# Patient Record
Sex: MALE | Race: BLACK OR AFRICAN AMERICAN
[De-identification: names, ages, dates, MRNs, and addresses within clinical notes are randomized per-mention and may not be internally consistent; named-entity substitution may affect disease eponyms.]

---

## 2020-03-12 ENCOUNTER — HOSPITAL ENCOUNTER (INPATIENT)
Dept: HOSPITAL 4 - SED | Age: 35
LOS: 3 days | Discharge: HOME | DRG: 872 | End: 2020-03-15
Payer: COMMERCIAL

## 2020-03-12 VITALS — BODY MASS INDEX: 41.75 KG/M2 | WEIGHT: 315 LBS | HEIGHT: 73 IN

## 2020-03-12 VITALS — SYSTOLIC BLOOD PRESSURE: 126 MMHG

## 2020-03-12 VITALS — SYSTOLIC BLOOD PRESSURE: 161 MMHG

## 2020-03-12 DIAGNOSIS — E66.01: ICD-10-CM

## 2020-03-12 DIAGNOSIS — Z90.49: ICD-10-CM

## 2020-03-12 DIAGNOSIS — Z79.899: ICD-10-CM

## 2020-03-12 DIAGNOSIS — Z79.2: ICD-10-CM

## 2020-03-12 DIAGNOSIS — I87.2: ICD-10-CM

## 2020-03-12 DIAGNOSIS — B00.2: ICD-10-CM

## 2020-03-12 DIAGNOSIS — A41.9: Primary | ICD-10-CM

## 2020-03-12 DIAGNOSIS — I87.8: ICD-10-CM

## 2020-03-12 DIAGNOSIS — L03.116: ICD-10-CM

## 2020-03-12 DIAGNOSIS — I88.9: ICD-10-CM

## 2020-03-12 DIAGNOSIS — I89.0: ICD-10-CM

## 2020-03-12 DIAGNOSIS — M10.9: ICD-10-CM

## 2020-03-12 LAB
ALBUMIN SERPL BCP-MCNC: 3 G/DL (ref 3.4–4.8)
ALT SERPL W P-5'-P-CCNC: 59 U/L (ref 12–78)
ANION GAP SERPL CALCULATED.3IONS-SCNC: 8 MMOL/L (ref 5–15)
APPEARANCE UR: CLEAR
AST SERPL W P-5'-P-CCNC: 41 U/L (ref 10–37)
BASOPHILS # BLD AUTO: 0 K/UL (ref 0–0.2)
BASOPHILS NFR BLD AUTO: 0.1 % (ref 0–2)
BILIRUB SERPL-MCNC: 0.4 MG/DL (ref 0–1)
BILIRUB UR QL STRIP: NEGATIVE
BUN SERPL-MCNC: 13 MG/DL (ref 8–21)
CALCIUM SERPL-MCNC: 9 MG/DL (ref 8.4–11)
CHLORIDE SERPL-SCNC: 101 MMOL/L (ref 98–107)
COLOR UR: YELLOW
CREAT SERPL-MCNC: 0.86 MG/DL (ref 0.55–1.3)
EOSINOPHIL # BLD AUTO: 0.1 K/UL (ref 0–0.4)
EOSINOPHIL NFR BLD AUTO: 0.9 % (ref 0–4)
ERYTHROCYTE [DISTWIDTH] IN BLOOD BY AUTOMATED COUNT: 14.9 % (ref 9–15)
GFR SERPL CREATININE-BSD FRML MDRD: 133 ML/MIN (ref 90–?)
GLUCOSE SERPL-MCNC: 118 MG/DL (ref 70–99)
GLUCOSE UR STRIP-MCNC: NEGATIVE MG/DL
HCT VFR BLD AUTO: 37.2 % (ref 36–54)
HGB BLD-MCNC: 12.1 G/DL (ref 14–18)
HGB UR QL STRIP: NEGATIVE
KETONES UR STRIP-MCNC: NEGATIVE MG/DL
LEUKOCYTE ESTERASE UR QL STRIP: NEGATIVE
LYMPHOCYTES # BLD AUTO: 0.7 K/UL (ref 1–5.5)
LYMPHOCYTES NFR BLD AUTO: 4.6 % (ref 20.5–51.5)
MCH RBC QN AUTO: 29 PG (ref 27–31)
MCHC RBC AUTO-ENTMCNC: 32 % (ref 32–36)
MCV RBC AUTO: 89 FL (ref 79–98)
MONOCYTES # BLD MANUAL: 0.5 K/UL (ref 0–1)
MONOCYTES # BLD MANUAL: 3.1 % (ref 1.7–9.3)
NEUTROPHILS # BLD AUTO: 14 K/UL (ref 1.8–7.7)
NEUTROPHILS NFR BLD AUTO: 91.3 % (ref 40–70)
NITRITE UR QL STRIP: NEGATIVE
PH UR STRIP: 6 [PH] (ref 5–8)
PLATELET # BLD AUTO: 343 K/UL (ref 130–430)
POTASSIUM SERPL-SCNC: 3.9 MMOL/L (ref 3.5–5.1)
PROT UR QL STRIP: NEGATIVE
RBC # BLD AUTO: 4.2 MIL/UL (ref 4.2–6.2)
SODIUM SERPLBLD-SCNC: 136 MMOL/L (ref 136–145)
SP GR UR STRIP: 1.01 (ref 1–1.03)
UROBILINOGEN UR STRIP-MCNC: 0.2 MG/DL (ref 0.2–1)
WBC # BLD AUTO: 15.3 K/UL (ref 4.8–10.8)

## 2020-03-12 RX ADMIN — DEXTROSE MONOHYDRATE SCH MLS/HR: 50 INJECTION, SOLUTION INTRAVENOUS at 21:25

## 2020-03-13 VITALS — SYSTOLIC BLOOD PRESSURE: 122 MMHG

## 2020-03-13 VITALS — SYSTOLIC BLOOD PRESSURE: 121 MMHG

## 2020-03-13 VITALS — SYSTOLIC BLOOD PRESSURE: 106 MMHG

## 2020-03-13 VITALS — SYSTOLIC BLOOD PRESSURE: 148 MMHG

## 2020-03-13 RX ADMIN — DEXTROSE MONOHYDRATE SCH MLS/HR: 50 INJECTION, SOLUTION INTRAVENOUS at 21:30

## 2020-03-13 RX ADMIN — HEPARIN SODIUM SCH UNITS: 5000 INJECTION, SOLUTION INTRAVENOUS; SUBCUTANEOUS at 21:33

## 2020-03-13 RX ADMIN — HEPARIN SODIUM SCH UNITS: 5000 INJECTION, SOLUTION INTRAVENOUS; SUBCUTANEOUS at 09:21

## 2020-03-13 RX ADMIN — SODIUM CHLORIDE SCH MLS/HR: 9 INJECTION, SOLUTION INTRAVENOUS at 16:02

## 2020-03-13 RX ADMIN — DEXTROSE MONOHYDRATE SCH MLS/HR: 50 INJECTION, SOLUTION INTRAVENOUS at 12:27

## 2020-03-13 RX ADMIN — CLINDAMYCIN PHOSPHATE SCH MLS/HR: 150 INJECTION, SOLUTION INTRAMUSCULAR; INTRAVENOUS at 23:55

## 2020-03-13 RX ADMIN — SODIUM CHLORIDE SCH MLS/HR: 9 INJECTION, SOLUTION INTRAVENOUS at 23:55

## 2020-03-13 RX ADMIN — DEXTROSE MONOHYDRATE SCH MLS/HR: 50 INJECTION, SOLUTION INTRAVENOUS at 05:16

## 2020-03-13 RX ADMIN — CLINDAMYCIN PHOSPHATE SCH MLS/HR: 150 INJECTION, SOLUTION INTRAMUSCULAR; INTRAVENOUS at 17:51

## 2020-03-13 RX ADMIN — SODIUM CHLORIDE SCH MLS/HR: 9 INJECTION, SOLUTION INTRAVENOUS at 09:20

## 2020-03-14 VITALS — SYSTOLIC BLOOD PRESSURE: 135 MMHG

## 2020-03-14 VITALS — SYSTOLIC BLOOD PRESSURE: 118 MMHG

## 2020-03-14 VITALS — SYSTOLIC BLOOD PRESSURE: 120 MMHG

## 2020-03-14 VITALS — SYSTOLIC BLOOD PRESSURE: 121 MMHG

## 2020-03-14 VITALS — SYSTOLIC BLOOD PRESSURE: 128 MMHG

## 2020-03-14 VITALS — SYSTOLIC BLOOD PRESSURE: 126 MMHG

## 2020-03-14 LAB
ANION GAP SERPL CALCULATED.3IONS-SCNC: 6 MMOL/L (ref 5–15)
BASOPHILS # BLD AUTO: 0 K/UL (ref 0–0.2)
BASOPHILS NFR BLD AUTO: 0.4 % (ref 0–2)
BUN SERPL-MCNC: 9 MG/DL (ref 8–21)
CALCIUM SERPL-MCNC: 8.4 MG/DL (ref 8.4–11)
CHLORIDE SERPL-SCNC: 103 MMOL/L (ref 98–107)
CREAT SERPL-MCNC: 0.82 MG/DL (ref 0.55–1.3)
EOSINOPHIL # BLD AUTO: 0.1 K/UL (ref 0–0.4)
EOSINOPHIL NFR BLD AUTO: 1.4 % (ref 0–4)
ERYTHROCYTE [DISTWIDTH] IN BLOOD BY AUTOMATED COUNT: 15.1 % (ref 9–15)
GFR SERPL CREATININE-BSD FRML MDRD: 140 ML/MIN (ref 90–?)
GLUCOSE SERPL-MCNC: 92 MG/DL (ref 70–99)
HCT VFR BLD AUTO: 31.6 % (ref 36–54)
HGB BLD-MCNC: 10.3 G/DL (ref 14–18)
LYMPHOCYTES # BLD AUTO: 1 K/UL (ref 1–5.5)
LYMPHOCYTES NFR BLD AUTO: 9 % (ref 20.5–51.5)
MCH RBC QN AUTO: 29 PG (ref 27–31)
MCHC RBC AUTO-ENTMCNC: 33 % (ref 32–36)
MCV RBC AUTO: 90 FL (ref 79–98)
MONOCYTES # BLD MANUAL: 0.9 K/UL (ref 0–1)
MONOCYTES # BLD MANUAL: 8 % (ref 1.7–9.3)
NEUTROPHILS # BLD AUTO: 8.7 K/UL (ref 1.8–7.7)
NEUTROPHILS NFR BLD AUTO: 81.2 % (ref 40–70)
PLATELET # BLD AUTO: 290 K/UL (ref 130–430)
POTASSIUM SERPL-SCNC: 3.8 MMOL/L (ref 3.5–5.1)
RBC # BLD AUTO: 3.52 MIL/UL (ref 4.2–6.2)
SODIUM SERPLBLD-SCNC: 136 MMOL/L (ref 136–145)
WBC # BLD AUTO: 10.7 K/UL (ref 4.8–10.8)

## 2020-03-14 RX ADMIN — CLINDAMYCIN PHOSPHATE SCH MLS/HR: 150 INJECTION, SOLUTION INTRAMUSCULAR; INTRAVENOUS at 17:48

## 2020-03-14 RX ADMIN — HEPARIN SODIUM SCH UNITS: 5000 INJECTION, SOLUTION INTRAVENOUS; SUBCUTANEOUS at 09:17

## 2020-03-14 RX ADMIN — CLINDAMYCIN PHOSPHATE SCH MLS/HR: 150 INJECTION, SOLUTION INTRAMUSCULAR; INTRAVENOUS at 23:58

## 2020-03-14 RX ADMIN — CLINDAMYCIN PHOSPHATE SCH MLS/HR: 150 INJECTION, SOLUTION INTRAMUSCULAR; INTRAVENOUS at 12:19

## 2020-03-14 RX ADMIN — HEPARIN SODIUM SCH UNITS: 5000 INJECTION, SOLUTION INTRAVENOUS; SUBCUTANEOUS at 21:09

## 2020-03-14 RX ADMIN — SODIUM CHLORIDE SCH MLS/HR: 9 INJECTION, SOLUTION INTRAVENOUS at 12:20

## 2020-03-14 RX ADMIN — DEXTROSE MONOHYDRATE SCH MLS/HR: 50 INJECTION, SOLUTION INTRAVENOUS at 15:25

## 2020-03-14 RX ADMIN — DEXTROSE MONOHYDRATE SCH MLS/HR: 50 INJECTION, SOLUTION INTRAVENOUS at 21:00

## 2020-03-14 RX ADMIN — SODIUM CHLORIDE SCH MLS/HR: 9 INJECTION, SOLUTION INTRAVENOUS at 05:02

## 2020-03-14 RX ADMIN — DEXTROSE MONOHYDRATE SCH MLS/HR: 50 INJECTION, SOLUTION INTRAVENOUS at 05:01

## 2020-03-14 RX ADMIN — SODIUM CHLORIDE SCH MLS/HR: 9 INJECTION, SOLUTION INTRAVENOUS at 21:00

## 2020-03-14 RX ADMIN — CLINDAMYCIN PHOSPHATE SCH MLS/HR: 150 INJECTION, SOLUTION INTRAMUSCULAR; INTRAVENOUS at 05:02

## 2020-03-15 VITALS — SYSTOLIC BLOOD PRESSURE: 127 MMHG

## 2020-03-15 VITALS — SYSTOLIC BLOOD PRESSURE: 139 MMHG

## 2020-03-15 VITALS — SYSTOLIC BLOOD PRESSURE: 136 MMHG

## 2020-03-15 LAB
ANION GAP SERPL CALCULATED.3IONS-SCNC: 6 MMOL/L (ref 5–15)
BASOPHILS # BLD AUTO: 0 K/UL (ref 0–0.2)
BASOPHILS NFR BLD AUTO: 0.8 % (ref 0–2)
BUN SERPL-MCNC: 9 MG/DL (ref 8–21)
CALCIUM SERPL-MCNC: 8.8 MG/DL (ref 8.4–11)
CHLORIDE SERPL-SCNC: 103 MMOL/L (ref 98–107)
CREAT SERPL-MCNC: 0.85 MG/DL (ref 0.55–1.3)
EOSINOPHIL # BLD AUTO: 0.2 K/UL (ref 0–0.4)
EOSINOPHIL NFR BLD AUTO: 3.9 % (ref 0–4)
ERYTHROCYTE [DISTWIDTH] IN BLOOD BY AUTOMATED COUNT: 15 % (ref 9–15)
GFR SERPL CREATININE-BSD FRML MDRD: 134 ML/MIN (ref 90–?)
GLUCOSE SERPL-MCNC: 107 MG/DL (ref 70–99)
HCT VFR BLD AUTO: 33.3 % (ref 36–54)
HGB BLD-MCNC: 10.8 G/DL (ref 14–18)
LYMPHOCYTES # BLD AUTO: 0.6 K/UL (ref 1–5.5)
LYMPHOCYTES NFR BLD AUTO: 11.2 % (ref 20.5–51.5)
MCH RBC QN AUTO: 29 PG (ref 27–31)
MCHC RBC AUTO-ENTMCNC: 32 % (ref 32–36)
MCV RBC AUTO: 90 FL (ref 79–98)
MONOCYTES # BLD MANUAL: 0.8 K/UL (ref 0–1)
MONOCYTES # BLD MANUAL: 14.6 % (ref 1.7–9.3)
NEUTROPHILS # BLD AUTO: 3.9 K/UL (ref 1.8–7.7)
NEUTROPHILS NFR BLD AUTO: 69.5 % (ref 40–70)
PLATELET # BLD AUTO: 295 K/UL (ref 130–430)
POTASSIUM SERPL-SCNC: 4.4 MMOL/L (ref 3.5–5.1)
RBC # BLD AUTO: 3.71 MIL/UL (ref 4.2–6.2)
SODIUM SERPLBLD-SCNC: 138 MMOL/L (ref 136–145)
WBC # BLD AUTO: 5.6 K/UL (ref 4.8–10.8)

## 2020-03-15 RX ADMIN — SODIUM CHLORIDE SCH MLS/HR: 9 INJECTION, SOLUTION INTRAVENOUS at 02:50

## 2020-03-15 RX ADMIN — HEPARIN SODIUM SCH UNITS: 5000 INJECTION, SOLUTION INTRAVENOUS; SUBCUTANEOUS at 09:03

## 2020-03-15 RX ADMIN — DEXTROSE MONOHYDRATE SCH MLS/HR: 50 INJECTION, SOLUTION INTRAVENOUS at 06:18

## 2020-03-15 RX ADMIN — CLINDAMYCIN PHOSPHATE SCH MLS/HR: 150 INJECTION, SOLUTION INTRAMUSCULAR; INTRAVENOUS at 05:13

## 2020-06-19 ENCOUNTER — HOSPITAL ENCOUNTER (EMERGENCY)
Dept: HOSPITAL 4 - SED | Age: 35
Discharge: HOME | End: 2020-06-19
Payer: COMMERCIAL

## 2020-06-19 VITALS — HEIGHT: 73 IN | WEIGHT: 315 LBS | BODY MASS INDEX: 41.75 KG/M2 | SYSTOLIC BLOOD PRESSURE: 155 MMHG

## 2020-06-19 VITALS — SYSTOLIC BLOOD PRESSURE: 145 MMHG

## 2020-06-19 DIAGNOSIS — M25.522: Primary | ICD-10-CM

## 2020-06-19 DIAGNOSIS — Z79.899: ICD-10-CM

## 2020-06-19 PROCEDURE — 73080 X-RAY EXAM OF ELBOW: CPT

## 2020-06-19 PROCEDURE — 93005 ELECTROCARDIOGRAM TRACING: CPT

## 2020-06-19 PROCEDURE — 99283 EMERGENCY DEPT VISIT LOW MDM: CPT

## 2020-06-19 PROCEDURE — 96372 THER/PROPH/DIAG INJ SC/IM: CPT

## 2020-06-23 ENCOUNTER — HOSPITAL ENCOUNTER (INPATIENT)
Dept: HOSPITAL 4 - SED | Age: 35
LOS: 7 days | Discharge: HOME HEALTH | DRG: 549 | End: 2020-06-30
Attending: INTERNAL MEDICINE | Admitting: INTERNAL MEDICINE
Payer: COMMERCIAL

## 2020-06-23 VITALS — BODY MASS INDEX: 41.75 KG/M2 | WEIGHT: 315 LBS | HEIGHT: 73 IN

## 2020-06-23 VITALS — SYSTOLIC BLOOD PRESSURE: 165 MMHG

## 2020-06-23 DIAGNOSIS — Z90.49: ICD-10-CM

## 2020-06-23 DIAGNOSIS — I10: ICD-10-CM

## 2020-06-23 DIAGNOSIS — M00.9: ICD-10-CM

## 2020-06-23 DIAGNOSIS — M00.222: Primary | ICD-10-CM

## 2020-06-23 DIAGNOSIS — B95.1: ICD-10-CM

## 2020-06-23 DIAGNOSIS — N39.0: ICD-10-CM

## 2020-06-23 DIAGNOSIS — L03.115: ICD-10-CM

## 2020-06-23 DIAGNOSIS — E66.01: ICD-10-CM

## 2020-06-23 DIAGNOSIS — M70.22: ICD-10-CM

## 2020-06-23 DIAGNOSIS — E44.0: ICD-10-CM

## 2020-06-23 DIAGNOSIS — L03.116: ICD-10-CM

## 2020-06-23 DIAGNOSIS — Z79.899: ICD-10-CM

## 2020-06-23 DIAGNOSIS — M10.9: ICD-10-CM

## 2020-06-23 DIAGNOSIS — B96.20: ICD-10-CM

## 2020-06-23 LAB
ALBUMIN SERPL BCP-MCNC: 2.8 G/DL (ref 3.4–4.8)
ALT SERPL W P-5'-P-CCNC: 66 U/L (ref 12–78)
ANION GAP SERPL CALCULATED.3IONS-SCNC: 7 MMOL/L (ref 5–15)
APPEARANCE FLD: (no result)
APPEARANCE SPUN FLD: (no result)
AST SERPL W P-5'-P-CCNC: 39 U/L (ref 10–37)
BASOPHILS # BLD AUTO: 0.1 K/UL (ref 0–0.2)
BASOPHILS NFR BLD AUTO: 0.9 % (ref 0–2)
BILIRUB SERPL-MCNC: 0.4 MG/DL (ref 0–1)
BODY FLD TYPE: (no result)
BODY FLD TYPE: (no result)
BUN SERPL-MCNC: 10 MG/DL (ref 8–21)
CALCIUM SERPL-MCNC: 8.8 MG/DL (ref 8.4–11)
CHLORIDE SERPL-SCNC: 100 MMOL/L (ref 98–107)
COLOR FLD: (no result)
CREAT SERPL-MCNC: 0.76 MG/DL (ref 0.55–1.3)
CRP SERPL-MCNC: 15.2 MG/DL (ref 0–0.5)
EOSINOPHIL # BLD AUTO: 0.4 K/UL (ref 0–0.4)
EOSINOPHIL FLD QL MICRO: 0 %
EOSINOPHIL NFR BLD AUTO: 5 % (ref 0–4)
ERYTHROCYTE [DISTWIDTH] IN BLOOD BY AUTOMATED COUNT: 16.3 % (ref 9–15)
GFR SERPL CREATININE-BSD FRML MDRD: 153 ML/MIN (ref 90–?)
GLUCOSE SERPL-MCNC: 92 MG/DL (ref 70–99)
HCT VFR BLD AUTO: 35.8 % (ref 36–54)
HGB BLD-MCNC: 11.4 G/DL (ref 14–18)
LYMPHOCYTES # BLD AUTO: 0.9 K/UL (ref 1–5.5)
LYMPHOCYTES NFR BLD AUTO: 10.1 % (ref 20.5–51.5)
LYMPHOCYTES NFR FLD MANUAL: 6 %
MCH RBC QN AUTO: 28 PG (ref 27–31)
MCHC RBC AUTO-ENTMCNC: 32 % (ref 32–36)
MCV RBC AUTO: 88 FL (ref 79–98)
MONOCYTES # BLD MANUAL: 0.6 K/UL (ref 0–1)
MONOCYTES # BLD MANUAL: 6.9 % (ref 1.7–9.3)
MONOCYTES NFR FLD MANUAL: 33 %
NEUTROPHILS # BLD AUTO: 6.7 K/UL (ref 1.8–7.7)
NEUTROPHILS NFR BLD AUTO: 77.1 % (ref 40–70)
NEUTROPHILS NFR FLD MANUAL: 61 %
PLATELET # BLD AUTO: 425 K/UL (ref 130–430)
POTASSIUM SERPL-SCNC: 4.2 MMOL/L (ref 3.5–5.1)
RBC # BLD AUTO: 4.08 MIL/UL (ref 4.2–6.2)
RBC # FLD MANUAL: (no result) /UL
SODIUM SERPLBLD-SCNC: 138 MMOL/L (ref 136–145)
SPECIMEN VOL FLD: 10 ML
URATE UR-MCNC: 5.5 MG/DL (ref 2.4–7)
WBC # BLD AUTO: 8.7 K/UL (ref 4.8–10.8)
WBC # FLD MANUAL: (no result) /UL

## 2020-06-23 PROCEDURE — C1751 CATH, INF, PER/CENT/MIDLINE: HCPCS

## 2020-06-23 NOTE — NUR
Patient arrived in the ED c/o severe left elbow pain that started a week ago.  
Denied any chest pain or shortness of breath.  Denied any fevers, chills, 
nausea or vomiting.  Patient is alert and oriented x4, respirations even and 
unlabored, speaking in full sentences, and ambulating with a steady gait.  VSS, 
pain level 8/10.  Informed of the approximate wait time.  Instructed to notify 
ED staff for any changes in condition or worsening of symptoms while waiting to 
be seen by an ED provider.  Patient verbalized understanding.

## 2020-06-23 NOTE — NUR
Pt resting in ED bed comfortably. Given additional pillow and additional 
blankets for comfort. Pt states pain is well-controlled. Tolerating IV 
antibiotics well.

## 2020-06-23 NOTE — NUR
bedside performing Sterile aspiration of synovial joint fluid for 
evaluation. aspirated with 18 guage sterile needle. 10cc of purulent/bloody 
drainage aspirated and sent to lab. Pt tolerated procedure well. Pain 
medications administered pre procedure and lidocaine used locally for comfort.

## 2020-06-23 NOTE — NUR
# 18 gauge angiocath placed to RAC.  Use of asceptic technique.  Opsite placed 
over site.  Blood return noted.  Blood for lab drawn from site.  Flushed with 
10 cc of normal saline.  No evidence of infiltration noted.  Patient tolerated 
well.

## 2020-06-24 VITALS — SYSTOLIC BLOOD PRESSURE: 166 MMHG

## 2020-06-24 VITALS — SYSTOLIC BLOOD PRESSURE: 142 MMHG

## 2020-06-24 VITALS — SYSTOLIC BLOOD PRESSURE: 148 MMHG

## 2020-06-24 VITALS — SYSTOLIC BLOOD PRESSURE: 139 MMHG

## 2020-06-24 VITALS — SYSTOLIC BLOOD PRESSURE: 117 MMHG

## 2020-06-24 LAB
APPEARANCE UR: CLEAR
BILIRUB UR QL STRIP: NEGATIVE
COLOR UR: YELLOW
GLUCOSE FLD-MCNC: 2 MG/DL
GLUCOSE UR STRIP-MCNC: NEGATIVE MG/DL
HGB UR QL STRIP: NEGATIVE
KETONES UR STRIP-MCNC: NEGATIVE MG/DL
LEUKOCYTE ESTERASE UR QL STRIP: NEGATIVE
NITRITE UR QL STRIP: NEGATIVE
PH UR STRIP: 6.5 [PH] (ref 5–8)
PROT FLD-MCNC: 6.5 G/DL
PROT UR QL STRIP: NEGATIVE
SP GR UR STRIP: 1.01 (ref 1–1.03)
UROBILINOGEN UR STRIP-MCNC: 0.2 MG/DL (ref 0.2–1)

## 2020-06-24 RX ADMIN — DEXTROSE AND SODIUM CHLORIDE SCH MLS/HR: 5; 450 INJECTION, SOLUTION INTRAVENOUS at 14:22

## 2020-06-24 RX ADMIN — DEXTROSE MONOHYDRATE SCH MLS/HR: 50 INJECTION, SOLUTION INTRAVENOUS at 14:22

## 2020-06-24 RX ADMIN — HYDROMORPHONE HYDROCHLORIDE PRN MG: 2 INJECTION INTRAMUSCULAR; INTRAVENOUS; SUBCUTANEOUS at 15:47

## 2020-06-24 RX ADMIN — DEXTROSE AND SODIUM CHLORIDE SCH MLS/HR: 5; 450 INJECTION, SOLUTION INTRAVENOUS at 05:48

## 2020-06-24 RX ADMIN — DEXTROSE AND SODIUM CHLORIDE SCH MLS/HR: 5; 450 INJECTION, SOLUTION INTRAVENOUS at 21:40

## 2020-06-24 RX ADMIN — FAMOTIDINE SCH MG: 20 TABLET, FILM COATED ORAL at 10:25

## 2020-06-24 RX ADMIN — COLCHICINE SCH MG: 0.6 TABLET, FILM COATED ORAL at 20:10

## 2020-06-24 RX ADMIN — HYDROMORPHONE HYDROCHLORIDE PRN MG: 2 INJECTION INTRAMUSCULAR; INTRAVENOUS; SUBCUTANEOUS at 05:51

## 2020-06-24 RX ADMIN — HYDROMORPHONE HYDROCHLORIDE PRN MG: 2 INJECTION INTRAMUSCULAR; INTRAVENOUS; SUBCUTANEOUS at 10:33

## 2020-06-24 RX ADMIN — HYDROMORPHONE HYDROCHLORIDE PRN MG: 2 INJECTION INTRAMUSCULAR; INTRAVENOUS; SUBCUTANEOUS at 20:10

## 2020-06-24 RX ADMIN — DEXTROSE MONOHYDRATE SCH MLS/HR: 50 INJECTION, SOLUTION INTRAVENOUS at 23:11

## 2020-06-24 NOTE — NUR
RN Rounds

patient sitting up in bed, respirations even and unlabored on room air, no acute distress 
noted, patient reports pain is controlled at this time.

## 2020-06-24 NOTE — NUR
DR NICOLE



RECEIVED PHONE CALL FROM DR NICOLE WHO STATED THAT THEIR GROUP IS NOT A PART OF PATIENT'S 
INSURANCE GROUP AND WILL NOT BE ABLE TO SEE PATIENT.  DR HAWTHORNE PAGED TO NOTIFY

## 2020-06-24 NOTE — NUR
CONSULTATION PAGED



REASON FOR CONSULTATION:SEPTIC ARTHRITIS

WAS CONSULT CALLED?y

PERSON WHO WAS NOTIFIED:

CONSULTING PHYSICIAN:ROBERTO ALLISON

CONSULTANT SPECIALTY:INFECTIOUS DISEASE

CONSULTANT PHONE NUMBER:[phone]

ORDERING PHYSICIAN:MARTINE LAZO

## 2020-06-24 NOTE — NUR
Late entry: received call from dr. Orona re: getting BENOIT or auth for dr. Garcia/ septic 
elbow joint consultation. There is no insurance contact number avaialble in Bar [employer]. I  
requested Celeste /admitting dept to provide the insurance contact number asap.

## 2020-06-24 NOTE — NUR
Pt resting in ED bed at this time. Assisted in re-adjusting. Tolerating IV 
Antibiotics well. No s/s of adverse reactions.

## 2020-06-24 NOTE — NUR
Ortho consult called:

for Dr. Garcia, regarding septic elbow joint, ordered by Dr. Orona, spoke with Rosmery.

## 2020-06-24 NOTE — NUR
OPENING NOTE

RECEIVED CARE OF PT AND SBAR REPORT. PT IS AAOX4, RESTING IN BED, IN NO ACUTE DISTRESS. 
BREATHING IS EVEN AND UNLABORED TO ROOM AIR. IVF INFUSING AT ORDERED RATE. SAFETY AND FALL 
PRECAUTIONS ARE IN PLACE. CALL LIGHT IS WITH PT. WILL MONITOR.

## 2020-06-24 NOTE — NUR
Opening Note

patient brought to room via gurney, received bedside SBAR report from ER RN, patient 
ambulated to bathroom, voided x1, patient ambulated to bed, provided patient with clean 
gown, cardiac monitor in place, IV fluids infusing well to left AC, provided patient with 
water and hygiene items, educated patient on use of call light and asked to call for 
assistance, patient verbalized understanding, call light in reach, educated patient on use 
of bed alarm for patient safety, patient refusing bed alarm, bed in low and locked position.

## 2020-06-24 NOTE — NUR
Patient will be admitted to care of Dr. Orona.  Admitted to Tele unit.  Will go 
to room 109A.  Belongings list completed.  Complete and up to date summary 
report printed. SBAR report to be given at bedside with opportunity for 
questions. IV site intact and currently infusing.

## 2020-06-24 NOTE — NUR
RN Rounds

patient sitting up in bed eating dinner, tolerating well, patient denies any nausea or 
vomiting, patient reports pain is controlled.

## 2020-06-24 NOTE — NUR
Pt states that pain is returning. requested Hydromorphone from Floor pyxis. 
Assisted in adjusting patient in bed. Pt tolerated movement well. Vancomycin 
completed.

## 2020-06-24 NOTE — NUR
Physician Rounds

Dr. Orona at bedside examining patient, informed Dr. Orona that per Dr. Garcia his group 
does not accept the patients insurance.

## 2020-06-24 NOTE — NUR
Closing Note

bedside SBAR report given to receiving RN, patient resting in bed, respirations even and 
unlabored on room air, no acute distress noted, educated patient on use of call light and 
asked to call for assistance, patient verbalized understanding, call light in reach, 
educated patient on use of bed alarm for patient safety, patient refusing bed alarm, bed in 
low and locked position, care endorsed to night shift RN.

## 2020-06-24 NOTE — NUR
RN Rounds

patient resting in bed, respirations even and unlabored on room air, no acute distress 
noted, patient reports pain is controlled at this time.

## 2020-06-25 VITALS — SYSTOLIC BLOOD PRESSURE: 122 MMHG

## 2020-06-25 VITALS — SYSTOLIC BLOOD PRESSURE: 117 MMHG

## 2020-06-25 VITALS — SYSTOLIC BLOOD PRESSURE: 140 MMHG

## 2020-06-25 VITALS — SYSTOLIC BLOOD PRESSURE: 142 MMHG

## 2020-06-25 LAB
ALBUMIN SERPL BCP-MCNC: 2.5 G/DL (ref 3.4–4.8)
ALT SERPL W P-5'-P-CCNC: 54 U/L (ref 12–78)
ANION GAP SERPL CALCULATED.3IONS-SCNC: < 3 MMOL/L (ref 5–15)
AST SERPL W P-5'-P-CCNC: 34 U/L (ref 10–37)
BASOPHILS # BLD AUTO: 0 K/UL (ref 0–0.2)
BASOPHILS NFR BLD AUTO: 0.5 % (ref 0–2)
BILIRUB SERPL-MCNC: 0.4 MG/DL (ref 0–1)
BUN SERPL-MCNC: 8 MG/DL (ref 8–21)
CALCIUM SERPL-MCNC: 8.4 MG/DL (ref 8.4–11)
CHLORIDE SERPL-SCNC: 97 MMOL/L (ref 98–107)
CREAT SERPL-MCNC: 0.7 MG/DL (ref 0.55–1.3)
EOSINOPHIL # BLD AUTO: 0.3 K/UL (ref 0–0.4)
EOSINOPHIL NFR BLD AUTO: 3.4 % (ref 0–4)
ERYTHROCYTE [DISTWIDTH] IN BLOOD BY AUTOMATED COUNT: 16 % (ref 9–15)
GFR SERPL CREATININE-BSD FRML MDRD: 168 ML/MIN (ref 90–?)
GLUCOSE SERPL-MCNC: 106 MG/DL (ref 70–99)
HCT VFR BLD AUTO: 33.3 % (ref 36–54)
HGB BLD-MCNC: 10.8 G/DL (ref 14–18)
LYMPHOCYTES # BLD AUTO: 0.7 K/UL (ref 1–5.5)
LYMPHOCYTES NFR BLD AUTO: 7.4 % (ref 20.5–51.5)
MCH RBC QN AUTO: 28 PG (ref 27–31)
MCHC RBC AUTO-ENTMCNC: 33 % (ref 32–36)
MCV RBC AUTO: 87 FL (ref 79–98)
MONOCYTES # BLD MANUAL: 0.7 K/UL (ref 0–1)
MONOCYTES # BLD MANUAL: 7.4 % (ref 1.7–9.3)
NEUTROPHILS # BLD AUTO: 7.5 K/UL (ref 1.8–7.7)
NEUTROPHILS NFR BLD AUTO: 81.3 % (ref 40–70)
PLATELET # BLD AUTO: 410 K/UL (ref 130–430)
POTASSIUM SERPL-SCNC: 4.4 MMOL/L (ref 3.5–5.1)
RBC # BLD AUTO: 3.82 MIL/UL (ref 4.2–6.2)
SODIUM SERPLBLD-SCNC: 131 MMOL/L (ref 136–145)
TSH SERPL DL<=0.05 MIU/L-ACNC: 6.35 UIU/ML (ref 0.34–4.82)
WBC # BLD AUTO: 9.2 K/UL (ref 4.8–10.8)

## 2020-06-25 RX ADMIN — HYDROMORPHONE HYDROCHLORIDE PRN MG: 2 INJECTION INTRAMUSCULAR; INTRAVENOUS; SUBCUTANEOUS at 01:40

## 2020-06-25 RX ADMIN — DEXTROSE MONOHYDRATE SCH MLS/HR: 50 INJECTION, SOLUTION INTRAVENOUS at 21:55

## 2020-06-25 RX ADMIN — HYDROMORPHONE HYDROCHLORIDE PRN MG: 2 INJECTION INTRAMUSCULAR; INTRAVENOUS; SUBCUTANEOUS at 09:47

## 2020-06-25 RX ADMIN — COLCHICINE SCH MG: 0.6 TABLET, FILM COATED ORAL at 09:39

## 2020-06-25 RX ADMIN — HYDROMORPHONE HYDROCHLORIDE PRN MG: 2 INJECTION INTRAMUSCULAR; INTRAVENOUS; SUBCUTANEOUS at 21:56

## 2020-06-25 RX ADMIN — HYDROMORPHONE HYDROCHLORIDE PRN MG: 2 INJECTION INTRAMUSCULAR; INTRAVENOUS; SUBCUTANEOUS at 14:33

## 2020-06-25 RX ADMIN — HYDROMORPHONE HYDROCHLORIDE PRN MG: 2 INJECTION INTRAMUSCULAR; INTRAVENOUS; SUBCUTANEOUS at 05:41

## 2020-06-25 RX ADMIN — DEXTROSE MONOHYDRATE SCH MLS/HR: 50 INJECTION, SOLUTION INTRAVENOUS at 05:41

## 2020-06-25 RX ADMIN — DEXTROSE MONOHYDRATE SCH MLS/HR: 50 INJECTION, SOLUTION INTRAVENOUS at 14:25

## 2020-06-25 RX ADMIN — COLCHICINE SCH MG: 0.6 TABLET, FILM COATED ORAL at 21:55

## 2020-06-25 RX ADMIN — COLCHICINE SCH MG: 0.6 TABLET, FILM COATED ORAL at 14:25

## 2020-06-25 RX ADMIN — FAMOTIDINE SCH MG: 20 TABLET, FILM COATED ORAL at 09:39

## 2020-06-25 NOTE — NUR
RN Rounds

patient sitting up in bed eating dinner, patient tolerating regular diet well, patient 
denies any nausea or vomiting.

## 2020-06-25 NOTE — NUR
INITIAL NOTE

RECEIVED PATIENT AWAKE IN BED WATHCING TV. LEFT SLING IN PLACE PILLOW SUPPORT UNDER BUE. BED 
ALARM ON, BED LOCKED AND IN LOWEST POSITION. SAFETY MEASURES IN PLACE. CALL LIGHT WITHIN 
REACH.

## 2020-06-25 NOTE — NUR
PAIN/DILAUDID

PT REPORTING SEVERE PAIN. DILAUDID 1 MG IVP ADMINISTERED AS ORDERED PRN FOR SEVERE PAIN. 
MEDICATION AND POTENTIAL SIDE EFFECTS DISCUSSED, PT VERBALIZED UNDERSTANDING. URINAL EMPTIED 
AT THIS TIME. PT DENIES FURTHER NEEDS. SAFETY AND FALL PRECAUTIONS MAINTAINED. WILL MONITOR.

## 2020-06-25 NOTE — NUR
Physician Rounds

Dr. Guthrie at bedside examining patient, new medication orders received, verified with 
read back.

## 2020-06-25 NOTE — NUR
RN Rounds

patient sitting up in bed, respirations even and unlabored on room air, no acute distress 
noted.

## 2020-06-25 NOTE — NUR
DC Planning: discussed dc plan with dr. Orona: She is reevaluating whether the pt will need 
to transfer out to a contracted insurance network for septic elbow or can f/u care out 
patient. Dr Orona made aware, [facility] is 
out of area here. The pt may need to transfer out for other intervention/surgery. Dr. Orona 
asked me to notified dr. Garcia for his  consultation consideration . I called dr. Garcia 
office asking for his return call. 

-------------------------------------------------------------------------------

Addendum: 06/25/20 at 1506 by Liborio Frederick RN

-------------------------------------------------------------------------------

>> Called back from dr. Garcia: stated he is not contracted with the [facility] and 
declined to see the pt. -- Dr Orona made aware.

## 2020-06-25 NOTE — NUR
SPOKE WITH DR HAWTHORNE

RELAYED MESSAGE FROM DR. FRANCISCO TO DR. HAWTHORNE. DR HAWTHORNE INFORMED NURSE DR NICOLE HAS BEEN 
CONTACTED FOR CONSULT.

## 2020-06-25 NOTE — NUR
Nutrition Update



Chip Scale 18 noted.

Pt admitted for septic, arthritis 

Diet: clear liquid

BMI: 66.0 kg/m2



RD to follow per nutrition care standards.

## 2020-06-25 NOTE — NUR
RN Rounds

patient resting in bed, respirations even and unlabored on room air, provided patient with 
water, no acute distress noted.

## 2020-06-25 NOTE — NUR
Opening Note

received bedside SBAR report from night shift RN, patient resting in bed, respirations even 
and unlabored on room air, no acute distress noted, patient reports pain is controlled at 
this time, educated patient on use of call light and asked to call for assistance, patient 
verbalized understanding, call light in reach, educated patient on use of bed alarm for 
patient safety, patient refusing bed alarm, bed in low and locked position.

## 2020-06-25 NOTE — NUR
Closing Note

bedside SBAR report given to receiving RN, patient ambulated to bathroom, BM x1, assisted 
patient to clean, no acute distress noted, educated patient on use of call light and asked 
to call for assistance, patient verbalized understanding, call light in reach, educated 
patient on use of bed alarm for patient safety, patient refusing bed alarm, bed in low and 
locked position, care endorsed to night shift RN.

## 2020-06-25 NOTE — NUR
MED PASS

PT REPORTING SEVERE PAIN. DILAUDID 1 MG IVP ADMINISTERED AS ORDERED PRN FOR SEVERE PAIN. 
SCHEDULED ANTIBIOTIC GIVEN. MEDICATION AND POTENTIAL SIDE EFFECTS DISCUSSED, PT VERBALIZED 
UNDERSTANDING. URINAL EMPTIED AT THIS TIME. PT DENIES FURTHER NEEDS. SAFETY AND FALL 
PRECAUTIONS MAINTAINED. WILL MONITOR.

## 2020-06-25 NOTE — NUR
PAIN MEDS 

PATIENT COMPLAINING OF PAIN TO LEFT ELBOW, MEDICATED AS ORDERED. PT TOLERATED PAIN MEDS.

## 2020-06-26 VITALS — SYSTOLIC BLOOD PRESSURE: 135 MMHG

## 2020-06-26 VITALS — SYSTOLIC BLOOD PRESSURE: 153 MMHG

## 2020-06-26 VITALS — SYSTOLIC BLOOD PRESSURE: 116 MMHG

## 2020-06-26 VITALS — SYSTOLIC BLOOD PRESSURE: 120 MMHG

## 2020-06-26 RX ADMIN — COLCHICINE SCH MG: 0.6 TABLET, FILM COATED ORAL at 20:38

## 2020-06-26 RX ADMIN — DEXTROSE MONOHYDRATE SCH MLS/HR: 50 INJECTION, SOLUTION INTRAVENOUS at 06:06

## 2020-06-26 RX ADMIN — SODIUM CHLORIDE SCH MLS/HR: 9 INJECTION, SOLUTION INTRAVENOUS at 23:20

## 2020-06-26 RX ADMIN — HYDROMORPHONE HYDROCHLORIDE PRN MG: 2 INJECTION INTRAMUSCULAR; INTRAVENOUS; SUBCUTANEOUS at 23:48

## 2020-06-26 RX ADMIN — HYDROMORPHONE HYDROCHLORIDE PRN MG: 2 INJECTION INTRAMUSCULAR; INTRAVENOUS; SUBCUTANEOUS at 01:16

## 2020-06-26 RX ADMIN — COLCHICINE SCH MG: 0.6 TABLET, FILM COATED ORAL at 15:28

## 2020-06-26 RX ADMIN — HYDROMORPHONE HYDROCHLORIDE PRN MG: 2 INJECTION INTRAMUSCULAR; INTRAVENOUS; SUBCUTANEOUS at 06:05

## 2020-06-26 RX ADMIN — COLCHICINE SCH MG: 0.6 TABLET, FILM COATED ORAL at 09:18

## 2020-06-26 RX ADMIN — SODIUM CHLORIDE SCH MLS/HR: 9 INJECTION, SOLUTION INTRAVENOUS at 18:00

## 2020-06-26 RX ADMIN — FAMOTIDINE SCH MG: 20 TABLET, FILM COATED ORAL at 09:19

## 2020-06-26 NOTE — NUR
IV INSERTED BY ANOTHER RN TRIED X2 UNSUCCESSFUL. TRIED X 3 UNSUCCESSFUL. ANOTHER RN WAS 
CALLED IV INSERTED ON

HIS RFA WITH ANGIO # 2O;COVERED WITH KERLIX PER PT'S REQUEST. CALL LIGHT WITHIN REACH.

## 2020-06-26 NOTE — NUR
Patient is in bed resting. Will endorse IV insertion to night shift nurse. Patient is alert 
and oriented x4. Patient denies pain and shortness of breath. Patient is able to ambulate 
independently. Bed in low position. Call light in reach. Will give report to night nurse.

## 2020-06-26 NOTE — NUR
DR FRANCISCO

RECEIVED CALL FROM DR FRANCISCO REQUESTING TO CONTACT DR HAWTHORNE TO INFORM OF RESULTS FROM 
ASPIRATION OF ELBOW FLUID AND TO REQUEST CONSULT WITH ID OR ORTHO. WILL CONTACT DR HAWTHORNE.

## 2020-06-26 NOTE — NUR
A/A/ O X4.DENIES ANY DISCOMFORT @ THIS TIME. DENIES SOB. NOTED LEFT UPPER EXTREMITY WITH 
SLING.LEFT ELBOW SWOLLEN AS PT STATED MOVEMENT IS LIMITED.LEFT LOWER EXTREMITY  WITH 
BLACKISH DISCOLORATION +3 EDEMA & 

SCALY & DRY.AFEBRILE./81. O2 SAT ON RA 94%.

## 2020-06-26 NOTE — NUR
NOTE

PATIENT AWAKE WATCHING TV, NO COMPLAINTS OF PAIN AT THIS TIME. LEFT SLING REMAINS IN PLACE. 
PATIENT REQUESTING SHOWER WILL ENDORSE TO AM NURSE.

## 2020-06-26 NOTE — NUR
Patient is in bed resting. Patient expressed pain in his left arm. Patient instructed about 
pain medication administration time and instructed patient that PRN pain medication will be 
delivered at the appropriate scheduled time. Patient denies shortness of breath. Patient is 
alert and oriented x4. Bed in low position. Call light in reach. Will continue to monitor 
patient throughout shift.

## 2020-06-26 NOTE — NUR
DC Planning: Per ARA Sosa, dr. Latif accepted the consultation and will be coming in to see 
the pt this afternoon. Dr Orona made aware.

## 2020-06-26 NOTE — NUR
CALLED A CONSULT TO DR OMKAR APODACA, RE: SEPTIC ARTHRITIS.   CALLED HIM ON HIS CELL AND HE 
SAID HE WILL SEE PT TODAY LATE AFTERNOON.

## 2020-06-27 VITALS — SYSTOLIC BLOOD PRESSURE: 132 MMHG

## 2020-06-27 VITALS — SYSTOLIC BLOOD PRESSURE: 108 MMHG

## 2020-06-27 VITALS — SYSTOLIC BLOOD PRESSURE: 118 MMHG

## 2020-06-27 VITALS — SYSTOLIC BLOOD PRESSURE: 115 MMHG

## 2020-06-27 VITALS — SYSTOLIC BLOOD PRESSURE: 134 MMHG

## 2020-06-27 LAB
ALBUMIN SERPL BCP-MCNC: 2.7 G/DL (ref 3.4–4.8)
ALT SERPL W P-5'-P-CCNC: 49 U/L (ref 12–78)
ANION GAP SERPL CALCULATED.3IONS-SCNC: 3 MMOL/L (ref 5–15)
AST SERPL W P-5'-P-CCNC: 39 U/L (ref 10–37)
BASOPHILS # BLD AUTO: 0 K/UL (ref 0–0.2)
BASOPHILS NFR BLD AUTO: 0.4 % (ref 0–2)
BILIRUB SERPL-MCNC: 0.4 MG/DL (ref 0–1)
BUN SERPL-MCNC: 10 MG/DL (ref 8–21)
CALCIUM SERPL-MCNC: 9.1 MG/DL (ref 8.4–11)
CHLORIDE SERPL-SCNC: 101 MMOL/L (ref 98–107)
CREAT SERPL-MCNC: 0.86 MG/DL (ref 0.55–1.3)
EOSINOPHIL # BLD AUTO: 0.3 K/UL (ref 0–0.4)
EOSINOPHIL NFR BLD AUTO: 4.1 % (ref 0–4)
ERYTHROCYTE [DISTWIDTH] IN BLOOD BY AUTOMATED COUNT: 15.9 % (ref 9–15)
GFR SERPL CREATININE-BSD FRML MDRD: 133 ML/MIN (ref 90–?)
GLUCOSE SERPL-MCNC: 103 MG/DL (ref 70–99)
HCT VFR BLD AUTO: 36.2 % (ref 36–54)
HGB BLD-MCNC: 11.7 G/DL (ref 14–18)
LYMPHOCYTES # BLD AUTO: 0.8 K/UL (ref 1–5.5)
LYMPHOCYTES NFR BLD AUTO: 10.3 % (ref 20.5–51.5)
MCH RBC QN AUTO: 28 PG (ref 27–31)
MCHC RBC AUTO-ENTMCNC: 32 % (ref 32–36)
MCV RBC AUTO: 87 FL (ref 79–98)
MONOCYTES # BLD MANUAL: 0.5 K/UL (ref 0–1)
MONOCYTES # BLD MANUAL: 5.5 % (ref 1.7–9.3)
NEUTROPHILS # BLD AUTO: 6.5 K/UL (ref 1.8–7.7)
NEUTROPHILS NFR BLD AUTO: 79.7 % (ref 40–70)
PLATELET # BLD AUTO: 479 K/UL (ref 130–430)
POTASSIUM SERPL-SCNC: 4.1 MMOL/L (ref 3.5–5.1)
RBC # BLD AUTO: 4.17 MIL/UL (ref 4.2–6.2)
SODIUM SERPLBLD-SCNC: 138 MMOL/L (ref 136–145)
WBC # BLD AUTO: 8.2 K/UL (ref 4.8–10.8)

## 2020-06-27 RX ADMIN — COLCHICINE SCH MG: 0.6 TABLET, FILM COATED ORAL at 22:54

## 2020-06-27 RX ADMIN — COLCHICINE SCH MG: 0.6 TABLET, FILM COATED ORAL at 09:00

## 2020-06-27 RX ADMIN — SODIUM CHLORIDE SCH MLS/HR: 9 INJECTION, SOLUTION INTRAVENOUS at 05:50

## 2020-06-27 RX ADMIN — INDOMETHACIN SCH MG: 25 CAPSULE ORAL at 22:54

## 2020-06-27 RX ADMIN — SODIUM CHLORIDE SCH MLS/HR: 9 INJECTION, SOLUTION INTRAVENOUS at 11:15

## 2020-06-27 RX ADMIN — SODIUM CHLORIDE SCH MLS/HR: 9 INJECTION, SOLUTION INTRAVENOUS at 17:10

## 2020-06-27 RX ADMIN — HYDROCODONE BITARTRATE AND ACETAMINOPHEN PRN TAB: 10; 325 TABLET ORAL at 16:17

## 2020-06-27 RX ADMIN — INDOMETHACIN SCH MG: 25 CAPSULE ORAL at 14:29

## 2020-06-27 RX ADMIN — HYDROMORPHONE HYDROCHLORIDE PRN MG: 2 INJECTION INTRAMUSCULAR; INTRAVENOUS; SUBCUTANEOUS at 06:13

## 2020-06-27 RX ADMIN — HYDROMORPHONE HYDROCHLORIDE PRN MG: 2 INJECTION INTRAMUSCULAR; INTRAVENOUS; SUBCUTANEOUS at 11:15

## 2020-06-27 RX ADMIN — FAMOTIDINE SCH MG: 20 TABLET, FILM COATED ORAL at 09:03

## 2020-06-27 RX ADMIN — SODIUM CHLORIDE SCH MLS/HR: 9 INJECTION, SOLUTION INTRAVENOUS at 23:37

## 2020-06-27 RX ADMIN — COLCHICINE SCH MG: 0.6 TABLET, FILM COATED ORAL at 14:28

## 2020-06-27 NOTE — NUR
Note

Pt ambulated to restroom to have bowel movement independently and went back to sitting on 
side of bed for about 30 minutes to an hour. No SOB/resp distress or severe left elbow 
pain/discomfort was noted. IV in right forearm intact and patent at this time. No needs 
noted. Pt was maintained with safety precautions all shift. Call light within reach. Left 
arm in sling. Pt able to move fingers and arm at request all shift.

## 2020-06-27 NOTE — NUR
Note

Pt sitting up in bed to eat his breakfast. No SOB/resp distress or severe left elbow 
pain/discomfort noted at this time. Left arm in sling at this time. IV in right forearm 
intact and patent. No needs noted at this time. Call light within reach.

## 2020-06-27 NOTE — NUR
Note

Dilaudid IVP did not help when reassessed at 1145am. Dr Orona was called and MD noted that 
Dr Latif (ortho) will be coming in to assess pt and will order some medication for the left 
elbow (gout) pain. Pt was notified.

## 2020-06-27 NOTE — NUR
Dietitian Recommendations 

- Recommend continuing regular diet

- Consider double portion protein entrees if pt desires



Please refer to Nutrition Assessment for details. 

SS, SONG

## 2020-06-27 NOTE — NUR
Note

Dr Orona on the floor and was notified that pt was positive for MDRO and E-coli in urine at 
this time.

## 2020-06-27 NOTE — NUR
Note

Dr Latif on the floor to assess pt and write orders at 1350. Pt sitting up in bed at this 
time. Left elbow elevated on pillow and heat applied to left elbow. Pain tolerable at this 
time. No needs noted at this time. Call light within reach.

## 2020-06-28 VITALS — SYSTOLIC BLOOD PRESSURE: 127 MMHG

## 2020-06-28 VITALS — SYSTOLIC BLOOD PRESSURE: 120 MMHG

## 2020-06-28 VITALS — SYSTOLIC BLOOD PRESSURE: 107 MMHG

## 2020-06-28 VITALS — SYSTOLIC BLOOD PRESSURE: 118 MMHG

## 2020-06-28 VITALS — SYSTOLIC BLOOD PRESSURE: 117 MMHG

## 2020-06-28 RX ADMIN — FAMOTIDINE SCH MG: 20 TABLET, FILM COATED ORAL at 08:26

## 2020-06-28 RX ADMIN — INDOMETHACIN SCH MG: 25 CAPSULE ORAL at 10:17

## 2020-06-28 RX ADMIN — HYDROCODONE BITARTRATE AND ACETAMINOPHEN PRN TAB: 10; 325 TABLET ORAL at 15:33

## 2020-06-28 RX ADMIN — COLCHICINE SCH MG: 0.6 TABLET, FILM COATED ORAL at 21:33

## 2020-06-28 RX ADMIN — COLCHICINE SCH MG: 0.6 TABLET, FILM COATED ORAL at 15:29

## 2020-06-28 RX ADMIN — HYDROCODONE BITARTRATE AND ACETAMINOPHEN PRN TAB: 10; 325 TABLET ORAL at 21:48

## 2020-06-28 RX ADMIN — INDOMETHACIN SCH MG: 25 CAPSULE ORAL at 15:29

## 2020-06-28 RX ADMIN — SODIUM CHLORIDE SCH MLS/HR: 9 INJECTION, SOLUTION INTRAVENOUS at 06:08

## 2020-06-28 RX ADMIN — SODIUM CHLORIDE SCH MLS/HR: 9 INJECTION, SOLUTION INTRAVENOUS at 11:41

## 2020-06-28 RX ADMIN — SODIUM CHLORIDE SCH MLS/HR: 9 INJECTION, SOLUTION INTRAVENOUS at 21:36

## 2020-06-28 RX ADMIN — HYDROCODONE BITARTRATE AND ACETAMINOPHEN PRN TAB: 10; 325 TABLET ORAL at 08:26

## 2020-06-28 RX ADMIN — COLCHICINE SCH MG: 0.6 TABLET, FILM COATED ORAL at 23:49

## 2020-06-28 RX ADMIN — INDOMETHACIN SCH MG: 25 CAPSULE ORAL at 21:34

## 2020-06-28 RX ADMIN — COLCHICINE SCH MG: 0.6 TABLET, FILM COATED ORAL at 08:22

## 2020-06-28 NOTE — NUR
MD round

Dr. Sutherland came to see the patient. MD is aware that patient wants to take only oral pain 
med for elbow pain. No new order this time.

## 2020-06-28 NOTE — NUR
pt.assessed.v/s assessed;values w/in normal limits.no c/o pain,nausea.i have apprised the 
pt.that snacks/beverages are available w/in the shift.pt.requested any food items.i have 
provided various 

food items.

pt.utilizing the urinal.i have attended to the urinal.measured/cleaned placed w/in access to 
the pt.

general status stable.respiratory status stable@room air.pt.capable to reposition/ambulate 
self.

call light/telephone w/in reach of the pt.

## 2020-06-28 NOTE — NUR
2100pmedications administered.pt.capable to ingest po medications w/out difficulty.

i have attended to the urinal;measured/placed w/in access of the pt.

## 2020-06-28 NOTE — NUR
Isolation

MDRO positive in Urine culture.Ampicillin was d/c and meropenem was started according to the 
sensibility. Place the isolation sign on the room.

## 2020-06-28 NOTE — NUR
Closing Note

Patient ambulated to bathroom, BM x1, assisted patient to clean, no acute distress noted, 
educated patient on use of call light and asked to call for assistance, patient verbalized 
understanding, call light in reach, bed in low and locked position, care endorsed to night 
shift RN.

## 2020-06-28 NOTE — NUR
change of shift,.pt.presents isolation status;contact;mdro;urine.pt.presents quiescent 
affect;calm,resting viewing tv programing.pt.presents iv access;location;rt.forearm;iv lock.

no c/o pain,nausea.pt.capable to reposition self.

general status stabel.respiratory status stable@room air.call light/telephone w/in reach of 

the pt.

## 2020-06-28 NOTE — NUR
OPENING NOTES

Received report from night shift nurse. Patient resting in bed, AAOx4, breathing evenly and 
nonlabored to room air. Patient has an SL on right arm, patent and benign, no s/s of 
infection or infiltration noted at this time. Educated patient on plan of care, 
fall/safety/isolation precautions, call light system, patient verbalized understanding with 
return demonstration. Bed is locked and at lowest position, will continue to monitor.

## 2020-06-29 VITALS — SYSTOLIC BLOOD PRESSURE: 124 MMHG

## 2020-06-29 VITALS — SYSTOLIC BLOOD PRESSURE: 129 MMHG

## 2020-06-29 VITALS — SYSTOLIC BLOOD PRESSURE: 140 MMHG

## 2020-06-29 VITALS — SYSTOLIC BLOOD PRESSURE: 155 MMHG

## 2020-06-29 VITALS — SYSTOLIC BLOOD PRESSURE: 137 MMHG

## 2020-06-29 RX ADMIN — HYDROCODONE BITARTRATE AND ACETAMINOPHEN PRN TAB: 10; 325 TABLET ORAL at 05:48

## 2020-06-29 RX ADMIN — SODIUM CHLORIDE SCH MLS/HR: 9 INJECTION, SOLUTION INTRAVENOUS at 04:27

## 2020-06-29 RX ADMIN — SODIUM CHLORIDE SCH MLS/HR: 9 INJECTION, SOLUTION INTRAVENOUS at 20:15

## 2020-06-29 RX ADMIN — HYDROCODONE BITARTRATE AND ACETAMINOPHEN PRN TAB: 10; 325 TABLET ORAL at 11:26

## 2020-06-29 RX ADMIN — FAMOTIDINE SCH MG: 20 TABLET, FILM COATED ORAL at 08:44

## 2020-06-29 RX ADMIN — INDOMETHACIN SCH MG: 25 CAPSULE ORAL at 20:15

## 2020-06-29 RX ADMIN — INDOMETHACIN SCH MG: 25 CAPSULE ORAL at 15:00

## 2020-06-29 RX ADMIN — SODIUM CHLORIDE SCH MLS/HR: 9 INJECTION, SOLUTION INTRAVENOUS at 11:17

## 2020-06-29 RX ADMIN — COLCHICINE SCH MG: 0.6 TABLET, FILM COATED ORAL at 05:39

## 2020-06-29 RX ADMIN — INDOMETHACIN SCH MG: 25 CAPSULE ORAL at 08:52

## 2020-06-29 RX ADMIN — HYDROCODONE BITARTRATE AND ACETAMINOPHEN PRN TAB: 10; 325 TABLET ORAL at 20:24

## 2020-06-29 RX ADMIN — COLCHICINE SCH MG: 0.6 TABLET, FILM COATED ORAL at 16:19

## 2020-06-29 RX ADMIN — COLCHICINE SCH MG: 0.6 TABLET, FILM COATED ORAL at 11:26

## 2020-06-29 NOTE — NUR
OPENING NOTES 

Patient is resting, sitting up in bed, no signs of distress observed.  IV site patent, 
dressings c/d/i. Urinal at bedside, E-coli urine, noted, isolation precautions to be kept 
throughout shift.  Call light within reach, bed alarm reused after patient education 
provided of bed alarm,  bed at lowest position.  Will continue to monitor.

## 2020-06-29 NOTE — NUR
pt.assessed.v/s assessed;values w/in normal limits.no c/o pain,nausea.i have attended to the 
urinal:measured/cleaned placed w/in access of the pt.no requests posited@this hour.general 

status stable.respiratory status stable.pt.capable to reposition self.call light/telephone 
reach 

of the pt.

## 2020-06-29 NOTE — NUR
pt.assessed.pt.presents quiescent affect;calm,somnolent.i have attended to the 
urinal;measured/cleaned placed w/in access of the pt.i have administered 
merrem;abx;ivpb:0400a dose.no c/o pain,nausea.no requests posited@this hour.general status 
stable.respiratory status stable;unlabored.call light/telephone w/in reach of the pt.

## 2020-06-29 NOTE — NUR
pt.assessed.pt had requested medication;pain.i have administered norco:10/325mg po.to 
re-assess 

the pain medication efficacy per pain mgx protocol.pt.requested fresh water;provided.i have 
attended to the urinal;measured/cleaned placed w/in access of the pt.call light/telephone 
w/in reach of the pt.

## 2020-06-29 NOTE — NUR
pt.assessed.pt.presnts quiescent affect;calm,somnolent.i have attended to the 
urinal;measured/

cleaned placed w/in access of the pt.pt.capable to reposition self.iv access intact;patent 
iv lock.

general status stable.respiratory status stable.call light/telephone w/in reach of the pt.

## 2020-06-30 VITALS — SYSTOLIC BLOOD PRESSURE: 144 MMHG

## 2020-06-30 VITALS — SYSTOLIC BLOOD PRESSURE: 120 MMHG

## 2020-06-30 VITALS — SYSTOLIC BLOOD PRESSURE: 135 MMHG

## 2020-06-30 VITALS — SYSTOLIC BLOOD PRESSURE: 151 MMHG

## 2020-06-30 LAB
INR PPP: 1.1 (ref 0.8–1.2)
PROTHROMBIN TIME: 10.7 SECS (ref 9.5–12.5)

## 2020-06-30 PROCEDURE — B54MZZA ULTRASONOGRAPHY OF RIGHT UPPER EXTREMITY VEINS, GUIDANCE: ICD-10-PCS | Performed by: INTERNAL MEDICINE

## 2020-06-30 PROCEDURE — 05HY33Z INSERTION OF INFUSION DEVICE INTO UPPER VEIN, PERCUTANEOUS APPROACH: ICD-10-PCS | Performed by: INTERNAL MEDICINE

## 2020-06-30 RX ADMIN — SODIUM CHLORIDE SCH MLS/HR: 9 INJECTION, SOLUTION INTRAVENOUS at 04:00

## 2020-06-30 RX ADMIN — INDOMETHACIN SCH MG: 25 CAPSULE ORAL at 10:04

## 2020-06-30 RX ADMIN — COLCHICINE SCH MG: 0.6 TABLET, FILM COATED ORAL at 05:40

## 2020-06-30 RX ADMIN — FAMOTIDINE SCH MG: 20 TABLET, FILM COATED ORAL at 10:03

## 2020-06-30 RX ADMIN — INDOMETHACIN SCH MG: 25 CAPSULE ORAL at 14:40

## 2020-06-30 RX ADMIN — SODIUM CHLORIDE SCH MLS/HR: 9 INJECTION, SOLUTION INTRAVENOUS at 16:37

## 2020-06-30 RX ADMIN — COLCHICINE SCH MG: 0.6 TABLET, FILM COATED ORAL at 00:47

## 2020-06-30 RX ADMIN — COLCHICINE SCH MG: 0.6 TABLET, FILM COATED ORAL at 12:27

## 2020-06-30 NOTE — NUR
MD ROUNDS

DR. HAWTHORNE DISCUSSED DISCHARGE PLANNING FOR PATIENT AT BEDSIDE. MD IS AWARE THAT THE PICC 
LINE NURSE COMING TODAY.

## 2020-06-30 NOTE — NUR
DC Planning: Updated pt status and faxed dc order to [facility]. fax# [phone], tel [phone] .

Per Jr : to contact [facility] for contracted HH and IV infusion c.o. 

Per JAMEL Cobos, PICC line is scheduled for the pt this afternoon. 

-------------------------------------------------------------------------------

Addendum: 06/30/20 at 1530 by Liborio Frederick RN

-------------------------------------------------------------------------------

HH and iV abx set up : per TIFFANIE Leon at [facility] to contact:

[facility]: faxed referral inquiry attn to Cyrus/Julián fax # [phone], tel # [phone]

[facility] infusion company: faxed referal inquiry attn to Golden/julián fax # 
[phone], tel # [phone].

-------------------------------------------------------------------------------

Addendum: 06/30/20 at 1553 by Liborio Frederick RN

-------------------------------------------------------------------------------

>> Called back from Vijay at [facility]: stated " Unable to accept the pt . The company 
currently not accepting [employer] insurance despite their approval."


-------------------------------------------------------------------------------

Addendum: 06/30/20 at 1614 by Liborio Frederick RN

-------------------------------------------------------------------------------

>> [facility] infusion co: Irwin Villar the pt is accepted, and is ready to deliver 
the IV med. The discharge is pending an accepting . 

-------------------------------------------------------------------------------

Addendum: 06/30/20 at 1701 by Liborio Frederick RN

-------------------------------------------------------------------------------

>> Irwin Villar : The home health is provided as well. She will be calling [employer] [facility] for 
authorization. JAMEL Cobos made aware the pt can go home today after last dose of iv abx.

## 2020-06-30 NOTE — NUR
LAST DOSE OF MERREM GIVEN

PICC LINE IS INTACT AND PATENT. IV ANTIBIOTICS GIVEN. EXPLAINED TO PATIENT ABOUT DISCHARGE 
PLANNING, VERBALIZED UNDERSTANDING.

## 2020-06-30 NOTE — NUR
Dietitian Recommendations 

- Recommend continuing regular diet

- Recommend double portion protein entrees 



Please see Nutrition F/U note for details.

SONG PAYAN

## 2020-06-30 NOTE — NUR
INITIAL NOTES

RESTING IN BED, ALERT AND ORIENTED. NO SHORTNESS OF BREATH ON ROOM AIR. COMPLAINED OF LEFT 
ARM PAIN. EATS BREAKFAST INDEPENDENTLY. EXPLAINED UPCOMING PICC LINE INSERTION. PATIENT 
VERBALIZED UNDERSTANDING. PER PATIENT HE WALKS TO THE RESTROOM. FALL ND SAFETY CHECKS DONE. 
CALL LIGHT WITHIN REACH. WILL CONTINUE TO MONITOR.

## 2020-06-30 NOTE — NUR
MED PASS

PATIENT TOOK HIS MEDICATIONS WELL. PAIN ON LEFT ARM IS CONTROLLED. TOLD PATIENT THAT PICC 
LINE NURSE CALLED AND SAID THAT HE WILL BE HERE AT 12NN. NEEDS ATTENDED. ENSURED SAFETY. 
WILL MONITOR.

## 2020-06-30 NOTE — NUR
Nutrition F/U



Admitting Diagnosis: Septic Arthritis

Medical History Comment: Per H+P note, pt is presenting w/ intractable left elbow pain, h/o 
gout, 

HTN (now WNL), and moderate malnutrition. Per updated MD note, pt is also 

experiencing bilateral lower extremity edema and a wound on the left and 

righ lower extremities. 

Subjective Information 

Pt remains in isolation and RD visit deferred. Per EMR review, pt continues to tolerate 
current diet, PO intake is good averaging 96% of 12 meals in 4 days. RD placed phone call to 
pt's room to offer nutrition education but no response x 2 attempts. 

Current diet remains appropriate.



Current Diet Order/Nutrition Support: Regular Diet x5 day. 



Pertinent Medications: Pepcid. Zofran

Pertinent Labs 

No new labs

Skin Integrity Comment: 

Chip Scale: 20 Wounds noted on both of the lower extremities. 

Current % PO 

Good (%)

NEW Estimated Energy Expenditure (kcals/day) 

1798-6585 kcal/day (25-30 kcal/kg IBW for Morbid Obesity)

NEW Estimated Protein Required (g/day) 

126-168 gm/day g/day (1.5-2 g/kg IBW for Morbid obesity and sepsis)

NEW Estimated Fluid Required (l/day) 

2.1-2.5 L/day (1ml/calorie for maintenance)

Problem/Etiology/Signs/Symptoms 

Morbid obesity related to lifestyle factors as evidenced by CBW of 500 

lbs, 272% IBW, and BMI of 66 kg/m2. (*ongoing)

Expected Outcomes/Goals 

Monitor appetite and PO intakes w/ goal of pt meeting at least 75% of 

estimated nutritional needs, labs trending WNL, normal GI function, and 

skin integrity/wt maintenance. 

Dietitian Recommendations 

- Recommend continuing regular diet

- Recommend double portion protein entrees 

Follow Up 

Moderate Risk: F/U in 3-5days

## 2020-06-30 NOTE — NUR
CLOSING NOTES 

Patient is resting, after ambulating to the restroom, no signs of distress observed.  IV was 
not able to obtained after 3 attempts.  Call light within reach, bed alarm refused after 
patient demonstrated proper call light use throughout shift.  All needs met throughout 
shift, will endorse care to oncoming shift.

## 2020-06-30 NOTE — NUR
D/C Patient

Patient given medication reconciliation form and D/C instructions. Exit Care provided. 
Patient verbalized understanding. MD discussed with patient the results and treatment 
provided. Ambulatory with steady gait for discharge to home. Patient in stable condition, ID 
band removed. PICC line intact. Rx of Colchicine, Indocin and Pepcid given. Patient educated 
on pain management. All belongings sent with patient.

## 2020-06-30 NOTE — NUR
ROUNDS

PAIN IS BETTER AT THIS TIME PER PATIENT. APPLIED LOTION ON BOTH LEGS. ENCOURAGED BED 
MOBILITY. ENSURED SAFETY. NO OTHER COMPLAINS AT THIS TIME.